# Patient Record
(demographics unavailable — no encounter records)

---

## 2025-01-02 NOTE — REVIEW OF SYSTEMS
[Recent Weight Gain (___ Lbs)] : recent weight gain: [unfilled] lbs [Nausea] : no nausea [Vomiting] : no vomiting [Negative] : Heme/Lymph [FreeTextEntry7] : Truncal obesity

## 2025-01-02 NOTE — PHYSICAL EXAM
[Alert] : alert [Well Nourished] : well nourished [No Acute Distress] : no acute distress [Well Developed] : well developed [Normal Sclera/Conjunctiva] : normal sclera/conjunctiva [EOMI] : extra ocular movement intact [No Proptosis] : no proptosis [No Lid Lag] : no lid lag [Normal Oropharynx] : the oropharynx was normal [Thyroid Not Enlarged] : the thyroid was not enlarged [No Thyroid Nodules] : no palpable thyroid nodules [No Respiratory Distress] : no respiratory distress [No Accessory Muscle Use] : no accessory muscle use [Clear to Auscultation] : lungs were clear to auscultation bilaterally [Normal S1, S2] : normal S1 and S2 [Normal Rate] : heart rate was normal [Regular Rhythm] : with a regular rhythm [No Edema] : no peripheral edema [Pedal Pulses Normal] : the pedal pulses are present [Normal Bowel Sounds] : normal bowel sounds [Not Tender] : non-tender [Not Distended] : not distended [Soft] : abdomen soft [Normal Anterior Cervical Nodes] : no anterior cervical lymphadenopathy [Normal Posterior Cervical Nodes] : no posterior cervical lymphadenopathy [No Spinal Tenderness] : no spinal tenderness [Spine Straight] : spine straight [No Stigmata of Cushings Syndrome] : no stigmata of Cushings Syndrome [Normal Gait] : normal gait [Normal Strength/Tone] : muscle strength and tone were normal [No Rash] : no rash [Acanthosis Nigricans] : no acanthosis nigricans [No Motor Deficits] : the motor exam was normal [Normal Reflexes] : deep tendon reflexes were 2+ and symmetric [No Tremors] : no tremors [Oriented x3] : oriented to person, place, and time [de-identified] : Patient's BMI is 42.97 [de-identified] : Mild thyromegaly no palpable nodules

## 2025-01-02 NOTE — HISTORY OF PRESENT ILLNESS
[FreeTextEntry1] : A pleasant 24-year-old  female who has a past medical history of hyperthyroidism most likely Graves' disease and who is currently maintained on methimazole 5 mg tablets daily and metoprolol 25 mg tablet daily.  Patient was unable to go for radioactive iodine treatment due to the fact that she was taking care off a small child.  Patient denies any significant symptoms.  She has not noticed any palpitations chest pain difficulty swallowing or hoarseness.  Her vision has been stable but she did notice increased loss of hair and amenorrhea for the past 1 year review of systems is otherwise negative her appetite is very good.

## 2025-01-02 NOTE — ASSESSMENT
[FreeTextEntry1] : Young  female with a past medical history of hypothyroidism currently maintained on methimazole and metoprolol.  Patient is clinically euthyroid and has been tolerating the medications without any side effects.  Patient is still reluctant to undergo radioactive iodine treatment as she is taking care of her 2-year-old toddler.  Recommendation 1.  I have advised the patient to reduce the dose of the methimazole to 2.5 mg daily 2.  Patient will need a blood test in approximately 4 to 6 weeks and if the TSH is normal we will continue on the current dose of the methimazole. 3.  Also the patient will continue to take the metoprolol ER 25 mg tablets daily. 4.  The importance of a low calorie diet and exercise were discussed with the patient in order to control her weight 5.  If clinically stable she will then follow-up in 3 months time thank you

## 2025-05-16 NOTE — PHYSICAL EXAM
[Alert] : alert [Well Nourished] : well nourished [No Acute Distress] : no acute distress [Well Developed] : well developed [Normal Sclera/Conjunctiva] : normal sclera/conjunctiva [EOMI] : extra ocular movement intact [No Proptosis] : no proptosis [Normal Oropharynx] : the oropharynx was normal [No Neck Mass] : no neck mass was observed [Thyroid Not Enlarged] : the thyroid was not enlarged [No Thyroid Nodules] : no palpable thyroid nodules [No Respiratory Distress] : no respiratory distress [No Accessory Muscle Use] : no accessory muscle use [Clear to Auscultation] : lungs were clear to auscultation bilaterally [Normal S1, S2] : normal S1 and S2 [Normal Rate] : heart rate was normal [Regular Rhythm] : with a regular rhythm [No Edema] : no peripheral edema [Pedal Pulses Normal] : the pedal pulses are present [Normal Bowel Sounds] : normal bowel sounds [Not Tender] : non-tender [Not Distended] : not distended [Soft] : abdomen soft [Normal Anterior Cervical Nodes] : no anterior cervical lymphadenopathy [Normal Posterior Cervical Nodes] : no posterior cervical lymphadenopathy [No Spinal Tenderness] : no spinal tenderness [Spine Straight] : spine straight [No Stigmata of Cushings Syndrome] : no stigmata of Cushings Syndrome [Normal Gait] : normal gait [Normal Strength/Tone] : muscle strength and tone were normal [No Rash] : no rash [No Skin Lesions] : no skin lesions [Acanthosis Nigricans] : no acanthosis nigricans [No Motor Deficits] : the motor exam was normal [No Sensory Deficits] : the sensory exam was normal to light touch and pinprick [Normal Reflexes] : deep tendon reflexes were 2+ and symmetric [No Tremors] : no tremors [Oriented x3] : oriented to person, place, and time

## 2025-05-16 NOTE — ASSESSMENT
[FreeTextEntry1] : Young  female with a past medical history of hyperthyroidism most likely secondary to Graves' disease who is currently maintained on a low-dose of methimazole and a beta-blocker.  Patient recently had a blood test and a TSH level is 0.518, free T4 is 1.56.  Complete metabolic panel is normal and the CBC also is normal.  Recommendation 1.  I will the patient to continue methimazole 2.5 mg tablet daily and also the metoprolol. 2.  Patient reluctant to undergo any radioactive iodine treatment has a small baby who is only 2 years old and she does not wish to expose the baby to any radioactivity. 3.  Patient will have a repeat blood test in approximately 3 months time after which she will return for follow-up evaluation. 4.  The above plan was discussed in detail with the patient.  Thank you

## 2025-05-16 NOTE — HISTORY OF PRESENT ILLNESS
[FreeTextEntry1] : 25-year-old  female who has a past medical history of hypothyroidism most likely secondary to Graves' disease, obesity, borderline type 2 diabetes presents for follow-up.  Patient is currently on methimazole 5 mg tablets half a tablet daily and metoprolol ER 25 mg tablets daily.  Patient claimed that she has she is feeling fine and she denies any signs or symptoms of palpitations, fatigue or tiredness or tremors.  She has no difficulty swallowing food or change in her voice.  Her hair has been stable except  lately that it  has noticed shedding more frequently her vision has remained stable.

## 2025-05-16 NOTE — REVIEW OF SYSTEMS
[Recent Weight Gain (___ Lbs)] : recent weight gain: [unfilled] lbs [Eye Pain] : no pain [Blurred Vision] : no blurred vision [Dysphagia] : no dysphagia [Dysphonia] : no dysphonia [Negative] : Heme/Lymph